# Patient Record
Sex: MALE | URBAN - METROPOLITAN AREA
[De-identification: names, ages, dates, MRNs, and addresses within clinical notes are randomized per-mention and may not be internally consistent; named-entity substitution may affect disease eponyms.]

---

## 2021-10-25 ENCOUNTER — EXCISION (OUTPATIENT)
Dept: URBAN - METROPOLITAN AREA CLINIC 11 | Facility: CLINIC | Age: 61
Setting detail: DERMATOLOGY
End: 2021-10-25

## 2021-10-25 DIAGNOSIS — L92.0 GRANULOMA ANNULARE: ICD-10-CM

## 2021-10-25 DIAGNOSIS — L82.1 OTHER SEBORRHEIC KERATOSIS: ICD-10-CM

## 2021-10-25 DIAGNOSIS — D22.5 MELANOCYTIC NEVI OF TRUNK: ICD-10-CM

## 2021-10-25 PROBLEM — C4359 172.5: Status: ACTIVE | Noted: 2021-10-25

## 2021-10-25 PROBLEM — D0359 172.5: Status: ACTIVE | Noted: 2021-10-25

## 2021-10-25 PROBLEM — D0352 172.5: Status: ACTIVE | Noted: 2021-10-25

## 2021-10-25 PROBLEM — D0351 172.5: Status: ACTIVE | Noted: 2021-10-25

## 2021-10-25 PROCEDURE — 11603 EXC TR-EXT MAL+MARG 2.1-3 CM: CPT

## 2021-10-25 PROCEDURE — 12032 INTMD RPR S/A/T/EXT 2.6-7.5: CPT

## 2022-01-11 ENCOUNTER — MOHS SURGERY-IMMUNO (OUTPATIENT)
Dept: URBAN - METROPOLITAN AREA CLINIC 11 | Facility: CLINIC | Age: 62
Setting detail: DERMATOLOGY
End: 2022-01-11

## 2022-01-11 DIAGNOSIS — L20.84 INTRINSIC (ALLERGIC) ECZEMA: ICD-10-CM

## 2022-01-11 DIAGNOSIS — L90.5 SCAR CONDITIONS AND FIBROSIS OF SKIN: ICD-10-CM

## 2022-01-11 DIAGNOSIS — L40.0 PSORIASIS VULGARIS: ICD-10-CM

## 2022-01-11 DIAGNOSIS — Z85.820 PERSONAL HISTORY OF MALIGNANT MELANOMA OF SKIN: ICD-10-CM

## 2022-01-11 PROCEDURE — 12053 INTMD RPR FACE/MM 5.1-7.5 CM: CPT

## 2022-01-11 PROCEDURE — 88342 IMHCHEM/IMCYTCHM 1ST ANTB: CPT

## 2022-01-11 PROCEDURE — 17311 MOHS 1 STAGE H/N/HF/G: CPT

## 2022-01-19 PROBLEM — C4330 172.3: Status: ACTIVE | Noted: 2022-01-19

## 2022-01-19 PROBLEM — C4339 172.3: Status: ACTIVE | Noted: 2022-01-19

## 2022-01-19 PROBLEM — D0330 172.3: Status: ACTIVE | Noted: 2022-01-19

## 2022-01-19 PROBLEM — D0339 172.3: Status: ACTIVE | Noted: 2022-01-19

## 2022-01-19 PROBLEM — C4331 172.3: Status: ACTIVE | Noted: 2022-01-19

## 2022-07-06 ENCOUNTER — MOHS SURGERY-IMMUNO (OUTPATIENT)
Dept: URBAN - METROPOLITAN AREA CLINIC 11 | Facility: CLINIC | Age: 62
Setting detail: DERMATOLOGY
End: 2022-07-06

## 2022-07-06 DIAGNOSIS — L82.0 INFLAMED SEBORRHEIC KERATOSIS: ICD-10-CM

## 2022-07-06 PROBLEM — C4359 172.5: Status: ACTIVE | Noted: 2022-07-06

## 2022-07-06 PROBLEM — D0359 172.5: Status: ACTIVE | Noted: 2022-07-06

## 2022-07-06 PROBLEM — D0351 172.5: Status: ACTIVE | Noted: 2022-07-06

## 2022-07-06 PROBLEM — D0352 172.5: Status: ACTIVE | Noted: 2022-07-06

## 2022-07-06 PROCEDURE — 88342 IMHCHEM/IMCYTCHM 1ST ANTB: CPT

## 2022-07-06 PROCEDURE — 12032 INTMD RPR S/A/T/EXT 2.6-7.5: CPT

## 2022-07-06 PROCEDURE — 17313 MOHS 1 STAGE T/A/L: CPT

## 2023-06-07 ENCOUNTER — HOSPITAL ENCOUNTER (EMERGENCY)
Age: 63
Discharge: HOME | End: 2023-06-07
Attending: EMERGENCY MEDICINE
Payer: COMMERCIAL

## 2023-06-07 VITALS
HEIGHT: 72 IN | SYSTOLIC BLOOD PRESSURE: 133 MMHG | DIASTOLIC BLOOD PRESSURE: 97 MMHG | TEMPERATURE: 97.7 F | HEART RATE: 75 BPM | WEIGHT: 200 LBS | OXYGEN SATURATION: 97 % | BODY MASS INDEX: 27.09 KG/M2 | RESPIRATION RATE: 18 BRPM

## 2023-06-07 DIAGNOSIS — I48.0 PAROXYSMAL A-FIB (HCC): ICD-10-CM

## 2023-06-07 DIAGNOSIS — I48.91 ATRIAL FIBRILLATION WITH RAPID VENTRICULAR RESPONSE (HCC): Primary | ICD-10-CM

## 2023-06-07 LAB
ANION GAP SERPL CALC-SCNC: 6 MMOL/L (ref 2–11)
BASOPHILS # BLD: 0.1 K/UL (ref 0–0.2)
BASOPHILS NFR BLD: 2 % (ref 0–2)
BUN SERPL-MCNC: 14 MG/DL (ref 8–23)
CALCIUM SERPL-MCNC: 9.1 MG/DL (ref 8.3–10.4)
CHLORIDE SERPL-SCNC: 106 MMOL/L (ref 101–110)
CO2 SERPL-SCNC: 27 MMOL/L (ref 21–32)
CREAT SERPL-MCNC: 1.12 MG/DL (ref 0.8–1.5)
DIFFERENTIAL METHOD BLD: NORMAL
EKG ATRIAL RATE: 106 BPM
EKG DIAGNOSIS: NORMAL
EKG P AXIS: 67 DEGREES
EKG P-R INTERVAL: 139 MS
EKG Q-T INTERVAL: 330 MS
EKG QRS DURATION: 99 MS
EKG QTC CALCULATION (BAZETT): 437 MS
EKG R AXIS: 44 DEGREES
EKG T AXIS: 65 DEGREES
EKG VENTRICULAR RATE: 105 BPM
EOSINOPHIL # BLD: 0.2 K/UL (ref 0–0.8)
EOSINOPHIL NFR BLD: 3 % (ref 0.5–7.8)
ERYTHROCYTE [DISTWIDTH] IN BLOOD BY AUTOMATED COUNT: 12.4 % (ref 11.9–14.6)
GLUCOSE SERPL-MCNC: 135 MG/DL (ref 65–100)
HCT VFR BLD AUTO: 42.4 % (ref 41.1–50.3)
HGB BLD-MCNC: 14.1 G/DL (ref 13.6–17.2)
IMM GRANULOCYTES # BLD AUTO: 0 K/UL (ref 0–0.5)
IMM GRANULOCYTES NFR BLD AUTO: 0 % (ref 0–5)
LYMPHOCYTES # BLD: 1.6 K/UL (ref 0.5–4.6)
LYMPHOCYTES NFR BLD: 30 % (ref 13–44)
MAGNESIUM SERPL-MCNC: 2.2 MG/DL (ref 1.8–2.4)
MCH RBC QN AUTO: 30.2 PG (ref 26.1–32.9)
MCHC RBC AUTO-ENTMCNC: 33.3 G/DL (ref 31.4–35)
MCV RBC AUTO: 90.8 FL (ref 82–102)
MONOCYTES # BLD: 0.4 K/UL (ref 0.1–1.3)
MONOCYTES NFR BLD: 8 % (ref 4–12)
NEUTS SEG # BLD: 3.1 K/UL (ref 1.7–8.2)
NEUTS SEG NFR BLD: 58 % (ref 43–78)
NRBC # BLD: 0 K/UL (ref 0–0.2)
PLATELET # BLD AUTO: 214 K/UL (ref 150–450)
PMV BLD AUTO: 10.7 FL (ref 9.4–12.3)
POTASSIUM SERPL-SCNC: 3.5 MMOL/L (ref 3.5–5.1)
RBC # BLD AUTO: 4.67 M/UL (ref 4.23–5.6)
SODIUM SERPL-SCNC: 139 MMOL/L (ref 133–143)
WBC # BLD AUTO: 5.4 K/UL (ref 4.3–11.1)

## 2023-06-07 PROCEDURE — 80048 BASIC METABOLIC PNL TOTAL CA: CPT

## 2023-06-07 PROCEDURE — 99284 EMERGENCY DEPT VISIT MOD MDM: CPT

## 2023-06-07 PROCEDURE — 93005 ELECTROCARDIOGRAM TRACING: CPT | Performed by: EMERGENCY MEDICINE

## 2023-06-07 PROCEDURE — 96360 HYDRATION IV INFUSION INIT: CPT

## 2023-06-07 PROCEDURE — 96361 HYDRATE IV INFUSION ADD-ON: CPT

## 2023-06-07 PROCEDURE — 85025 COMPLETE CBC W/AUTO DIFF WBC: CPT

## 2023-06-07 PROCEDURE — 83735 ASSAY OF MAGNESIUM: CPT

## 2023-06-07 PROCEDURE — 93010 ELECTROCARDIOGRAM REPORT: CPT | Performed by: INTERNAL MEDICINE

## 2023-06-07 PROCEDURE — 6370000000 HC RX 637 (ALT 250 FOR IP): Performed by: EMERGENCY MEDICINE

## 2023-06-07 PROCEDURE — 2580000003 HC RX 258: Performed by: EMERGENCY MEDICINE

## 2023-06-07 RX ORDER — SODIUM CHLORIDE, SODIUM LACTATE, POTASSIUM CHLORIDE, AND CALCIUM CHLORIDE .6; .31; .03; .02 G/100ML; G/100ML; G/100ML; G/100ML
1000 INJECTION, SOLUTION INTRAVENOUS ONCE
Status: COMPLETED | OUTPATIENT
Start: 2023-06-07 | End: 2023-06-07

## 2023-06-07 RX ADMIN — POTASSIUM BICARBONATE 40 MEQ: 782 TABLET, EFFERVESCENT ORAL at 14:26

## 2023-06-07 RX ADMIN — SODIUM CHLORIDE, POTASSIUM CHLORIDE, SODIUM LACTATE AND CALCIUM CHLORIDE 1000 ML: 600; 310; 30; 20 INJECTION, SOLUTION INTRAVENOUS at 13:55

## 2023-06-07 ASSESSMENT — PAIN - FUNCTIONAL ASSESSMENT: PAIN_FUNCTIONAL_ASSESSMENT: NONE - DENIES PAIN

## 2023-06-07 NOTE — ED PROVIDER NOTES
06/07/23 1400 -- 84 21 120/89 93 %   06/07/23 1345 -- 94 18 (!) 126/91 93 %   06/07/23 1344 -- 90 13 -- 95 %   06/07/23 1343 -- 97 24 -- 94 %   06/07/23 1342 -- 97 22 -- 98 %   06/07/23 1341 -- (!) 107 24 -- 93 %   06/07/23 1340 -- 95 19 -- 95 %   06/07/23 1339 -- (!) 106 21 -- 97 %   06/07/23 1338 -- (!) 101 22 -- 96 %   06/07/23 1337 -- 98 15 -- 96 %   06/07/23 1336 -- 100 22 -- 96 %   06/07/23 1335 -- 97 22 -- 95 %   06/07/23 1334 -- (!) 118 18 -- 93 %   06/07/23 1333 -- (!) 132 19 -- 96 %   06/07/23 1332 97.7 °F (36.5 °C) 88 18 135/77 96 %     General: Alert and oriented ×4, no acute distress   Eyes: Anicteric, conjunctiva pink, PERRLA, EOMI  ENT: No nasal discharge, no gross nasal congestion present  Pulmonary: Clear to auscultation bilaterally with symmetric chest rise, no increased work of breathing, no accessory muscle use  Cardiovascular: Regular rate and rhythm, no rub or gallop appreciated on my exam  GI: Abdomen is soft, nontender, nondistended  Musculoskeletal: No obvious joint deformity or joint effusion, normal joint range of motion  Neuro: Cranial nerves II through VII grossly intact, strength and sensation is grossly intact in the upper and lower extremities bilaterally  Skin: Skin is warm and dry    Procedures  Results for orders placed or performed during the hospital encounter of 06/07/23   CBC with Auto Differential   Result Value Ref Range    WBC 5.4 4.3 - 11.1 K/uL    RBC 4.67 4.23 - 5.6 M/uL    Hemoglobin 14.1 13.6 - 17.2 g/dL    Hematocrit 42.4 41.1 - 50.3 %    MCV 90.8 82.0 - 102.0 FL    MCH 30.2 26.1 - 32.9 PG    MCHC 33.3 31.4 - 35.0 g/dL    RDW 12.4 11.9 - 14.6 %    Platelets 341 110 - 450 K/uL    MPV 10.7 9.4 - 12.3 FL    nRBC 0.00 0.0 - 0.2 K/uL    Differential Type AUTOMATED      Neutrophils % 58 43 - 78 %    Lymphocytes % 30 13 - 44 %    Monocytes % 8 4.0 - 12.0 %    Eosinophils % 3 0.5 - 7.8 %    Basophils % 2 0.0 - 2.0 %    Immature Granulocytes 0 0.0 - 5.0 %    Neutrophils

## 2023-06-07 NOTE — ED NOTES
I have reviewed discharge instructions with the patient. The patient verbalized understanding. Patient left ED via Discharge Method: ambulatory to Home with self. Opportunity for questions and clarification provided. Patient given 0 scripts. To continue your aftercare when you leave the hospital, you may receive an automated call from our care team to check in on how you are doing. This is a free service and part of our promise to provide the best care and service to meet your aftercare needs.  If you have questions, or wish to unsubscribe from this service please call [phone]. Thank you for Choosing our [facility] Emergency Department.         Yareli Dumont RN  06/07/23 2935

## 2023-06-07 NOTE — ED TRIAGE NOTES
Patient reports he was driving, felt his heart begin to race. Patient used watch and found heart rate elevated at 210 BPM. Patient took diltiazem 30mg PO.

## 2023-06-07 NOTE — DISCHARGE INSTRUCTIONS
Please return for any questions, concerns or worsening symptoms, particularly increasing/unremitting chest pain, fainting episodes, heart palpitations, lightheadedness, shortness of breath.